# Patient Record
Sex: MALE | URBAN - METROPOLITAN AREA
[De-identification: names, ages, dates, MRNs, and addresses within clinical notes are randomized per-mention and may not be internally consistent; named-entity substitution may affect disease eponyms.]

---

## 2024-06-18 ENCOUNTER — OFFICE VISIT (OUTPATIENT)
Dept: FAMILY MEDICINE CLINIC | Facility: CLINIC | Age: 49
End: 2024-06-18

## 2024-06-18 ENCOUNTER — LAB ENCOUNTER (OUTPATIENT)
Dept: LAB | Age: 49
End: 2024-06-18
Attending: FAMILY MEDICINE

## 2024-06-18 VITALS
SYSTOLIC BLOOD PRESSURE: 128 MMHG | WEIGHT: 249.19 LBS | BODY MASS INDEX: 33.75 KG/M2 | HEIGHT: 72 IN | DIASTOLIC BLOOD PRESSURE: 86 MMHG | HEART RATE: 84 BPM

## 2024-06-18 DIAGNOSIS — R51.9 NONINTRACTABLE EPISODIC HEADACHE, UNSPECIFIED HEADACHE TYPE: ICD-10-CM

## 2024-06-18 DIAGNOSIS — H93.13 TINNITUS OF BOTH EARS: ICD-10-CM

## 2024-06-18 DIAGNOSIS — N20.0 RENAL STONE: ICD-10-CM

## 2024-06-18 DIAGNOSIS — I10 PRIMARY HYPERTENSION: ICD-10-CM

## 2024-06-18 DIAGNOSIS — I10 PRIMARY HYPERTENSION: Primary | ICD-10-CM

## 2024-06-18 DIAGNOSIS — L20.84 INTRINSIC ECZEMA: ICD-10-CM

## 2024-06-18 LAB
ALBUMIN SERPL-MCNC: 4.9 G/DL (ref 3.2–4.8)
ALBUMIN/GLOB SERPL: 1.6 {RATIO} (ref 1–2)
ALP LIVER SERPL-CCNC: 117 U/L
ALT SERPL-CCNC: 36 U/L
ANION GAP SERPL CALC-SCNC: 7 MMOL/L (ref 0–18)
AST SERPL-CCNC: 28 U/L (ref ?–34)
BASOPHILS # BLD AUTO: 0.05 X10(3) UL (ref 0–0.2)
BASOPHILS NFR BLD AUTO: 0.6 %
BILIRUB SERPL-MCNC: 0.5 MG/DL (ref 0.3–1.2)
BUN BLD-MCNC: 16 MG/DL (ref 9–23)
BUN/CREAT SERPL: 15.7 (ref 10–20)
CALCIUM BLD-MCNC: 9.9 MG/DL (ref 8.7–10.4)
CHLORIDE SERPL-SCNC: 101 MMOL/L (ref 98–112)
CO2 SERPL-SCNC: 31 MMOL/L (ref 21–32)
CREAT BLD-MCNC: 1.02 MG/DL
DEPRECATED RDW RBC AUTO: 39.5 FL (ref 35.1–46.3)
EGFRCR SERPLBLD CKD-EPI 2021: 90 ML/MIN/1.73M2 (ref 60–?)
EOSINOPHIL # BLD AUTO: 0.2 X10(3) UL (ref 0–0.7)
EOSINOPHIL NFR BLD AUTO: 2.3 %
ERYTHROCYTE [DISTWIDTH] IN BLOOD BY AUTOMATED COUNT: 13.2 % (ref 11–15)
FASTING STATUS PATIENT QL REPORTED: NO
GLOBULIN PLAS-MCNC: 3.1 G/DL (ref 2–3.5)
GLUCOSE BLD-MCNC: 110 MG/DL (ref 70–99)
HCT VFR BLD AUTO: 42.2 %
HGB BLD-MCNC: 14.6 G/DL
IMM GRANULOCYTES # BLD AUTO: 0.03 X10(3) UL (ref 0–1)
IMM GRANULOCYTES NFR BLD: 0.3 %
LYMPHOCYTES # BLD AUTO: 2.08 X10(3) UL (ref 1–4)
LYMPHOCYTES NFR BLD AUTO: 24.2 %
MCH RBC QN AUTO: 28.4 PG (ref 26–34)
MCHC RBC AUTO-ENTMCNC: 34.6 G/DL (ref 31–37)
MCV RBC AUTO: 82.1 FL
MONOCYTES # BLD AUTO: 0.39 X10(3) UL (ref 0.1–1)
MONOCYTES NFR BLD AUTO: 4.5 %
NEUTROPHILS # BLD AUTO: 5.84 X10 (3) UL (ref 1.5–7.7)
NEUTROPHILS # BLD AUTO: 5.84 X10(3) UL (ref 1.5–7.7)
NEUTROPHILS NFR BLD AUTO: 68.1 %
OSMOLALITY SERPL CALC.SUM OF ELEC: 290 MOSM/KG (ref 275–295)
PLATELET # BLD AUTO: 314 10(3)UL (ref 150–450)
POTASSIUM SERPL-SCNC: 4.2 MMOL/L (ref 3.5–5.1)
PROT SERPL-MCNC: 8 G/DL (ref 5.7–8.2)
PTH-INTACT SERPL-MCNC: 45.3 PG/ML (ref 18.5–88)
RBC # BLD AUTO: 5.14 X10(6)UL
SODIUM SERPL-SCNC: 139 MMOL/L (ref 136–145)
WBC # BLD AUTO: 8.6 X10(3) UL (ref 4–11)

## 2024-06-18 PROCEDURE — 99204 OFFICE O/P NEW MOD 45 MIN: CPT | Performed by: FAMILY MEDICINE

## 2024-06-18 PROCEDURE — 80053 COMPREHEN METABOLIC PANEL: CPT

## 2024-06-18 PROCEDURE — 83970 ASSAY OF PARATHORMONE: CPT

## 2024-06-18 PROCEDURE — 85025 COMPLETE CBC W/AUTO DIFF WBC: CPT

## 2024-06-18 PROCEDURE — 36415 COLL VENOUS BLD VENIPUNCTURE: CPT

## 2024-06-18 RX ORDER — LOSARTAN POTASSIUM AND HYDROCHLOROTHIAZIDE 12.5; 5 MG/1; MG/1
1 TABLET ORAL AS DIRECTED
COMMUNITY
Start: 2024-03-25 | End: 2024-06-18

## 2024-06-18 RX ORDER — TAMSULOSIN HYDROCHLORIDE 0.4 MG/1
0.4 CAPSULE ORAL DAILY
COMMUNITY
Start: 2024-03-18

## 2024-06-18 RX ORDER — AMLODIPINE BESYLATE 5 MG/1
5 TABLET ORAL AS DIRECTED
Qty: 90 TABLET | Refills: 2 | Status: SHIPPED | OUTPATIENT
Start: 2024-06-18

## 2024-06-18 RX ORDER — IBUPROFEN 600 MG/1
600 TABLET ORAL EVERY 6 HOURS PRN
Qty: 60 TABLET | Refills: 0 | Status: SHIPPED | OUTPATIENT
Start: 2024-06-18

## 2024-06-18 RX ORDER — AMLODIPINE BESYLATE 5 MG/1
5 TABLET ORAL AS DIRECTED
COMMUNITY
Start: 2024-04-01 | End: 2024-06-18

## 2024-06-18 RX ORDER — ERGOCALCIFEROL 1.25 MG/1
50000 CAPSULE ORAL WEEKLY
COMMUNITY
Start: 2024-04-01

## 2024-06-18 RX ORDER — MELOXICAM 15 MG/1
15 TABLET ORAL DAILY
COMMUNITY
Start: 2024-03-18

## 2024-06-18 RX ORDER — LOSARTAN POTASSIUM AND HYDROCHLOROTHIAZIDE 12.5; 5 MG/1; MG/1
1 TABLET ORAL AS DIRECTED
Qty: 90 TABLET | Refills: 2 | Status: SHIPPED | OUTPATIENT
Start: 2024-06-18

## 2024-06-18 RX ORDER — MOMETASONE FUROATE 1 MG/G
CREAM TOPICAL
Qty: 30 G | Refills: 1 | Status: SHIPPED | OUTPATIENT
Start: 2024-06-18

## 2024-06-18 NOTE — PROGRESS NOTES
6/18/2024  1:03 PM    Ilya Martinez is a 49 year old male.    Chief complaint(s):   Chief Complaint   Patient presents with    [facility] Care    Fatigue    Blood Pressure     HPI:     Ilya Martinez primary complaint is regarding multiple complaints.       Ilya He a 49 year old male presents with hypertension.  This was first diagnosed more than 03/2024 years ago.  Current nonpharmacologic treatment includes low sodium diet, exercise, and meditation.  his current cardiac medication(s) regimen includes: Amlodipine 5 mg, Losartan-hydrochlorothiazide 50-12.5 mg.  he has not kept a blood pressure diary, but states that his blood pressures have been well controll.  he is tolerating his medication(s)  well without side effects.  Compliance with treatment has been good.      In addition patient is complaining of tinnitus and headaches associated when he bends down forward.  The present headache is severe not associate with any diplopia, nausea or vomiting.  Also denies any hearing loss.  Denies any ear pain.    Furthermore he is complaining of having history of renal lithiasis for which she had past instilled.  He was given some tamsulosin which he stopped once the stone has passed through.  At present time there is no dysuria or hematuria.    Also complaining of a rash that is flaky on his forehead and around the nose area.  This started just few weeks ago.  Associated with minimal itchiness.  No history of dermal conditions or skin cancer.  Patient is a non-smoker.         HISTORY:  Past Medical History:    Essential hypertension    Renal stones      No past surgical history on file.   Family History   Problem Relation Age of Onset    Heart Disorder Father 80        AMI    Hypertension Mother     Cancer Other         prostate cancer      Social History:   Social History     Socioeconomic History    Marital status:    Tobacco Use    Smoking status: Never    Smokeless tobacco: Never        Immunizations:      There is no immunization history on file for this patient.    Medications (Active prior to today's visit):  Current Outpatient Medications   Medication Sig Dispense Refill    amLODIPine 5 MG Oral Tab Take 1 tablet (5 mg total) by mouth As Directed. 90 tablet 2    losartan-hydroCHLOROthiazide 50-12.5 MG Oral Tab Take 1 tablet by mouth As Directed. 90 tablet 2    Mometasone Furoate 0.1 % External Cream Apply to affected area(s) BID 30 g 1    ibuprofen 600 MG Oral Tab Take 1 tablet (600 mg total) by mouth every 6 (six) hours as needed for Pain. Always take it with food. 60 tablet 0    ergocalciferol 1.25 MG (45007 UT) Oral Cap Take 1 capsule (50,000 Units total) by mouth once a week. (Patient not taking: Reported on 6/18/2024)      Meloxicam 15 MG Oral Tab Take 1 tablet (15 mg total) by mouth daily. (Patient not taking: Reported on 6/18/2024)      tamsulosin 0.4 MG Oral Cap Take 1 capsule (0.4 mg total) by mouth daily. (Patient not taking: Reported on 6/18/2024)         Allergies:  Not on File      ROS:   Review of Systems   Constitutional:  Negative for appetite change, fatigue and fever.   HENT:  Positive for tinnitus. Negative for hearing loss.    Eyes:  Negative for visual disturbance.   Respiratory:  Negative for shortness of breath.    Cardiovascular:  Negative for chest pain, palpitations and leg swelling.   Gastrointestinal:  Negative for abdominal pain, nausea and vomiting.   Musculoskeletal:  Negative for myalgias.   Skin:  Positive for rash.   Neurological:  Positive for headaches. Negative for dizziness and weakness.     PHYSICAL EXAM:   VS: /86   Pulse 84   Ht 6' (1.829 m)   Wt 249 lb 3.2 oz (113 kg)   BMI 33.80 kg/m²     Physical Exam  Vitals reviewed.   Constitutional:       Appearance: Normal appearance. He is well-developed.   HENT:      Head: Normocephalic.   Eyes:      General: No scleral icterus.     Conjunctiva/sclera: Conjunctivae normal.   Cardiovascular:      Rate and Rhythm:  Normal rate and regular rhythm.      Heart sounds: Normal heart sounds.   Pulmonary:      Effort: Pulmonary effort is normal.      Breath sounds: Normal breath sounds.   Abdominal:      Tenderness: There is no right CVA tenderness or left CVA tenderness.   Musculoskeletal:      Cervical back: Neck supple.   Skin:     Findings: Rash present.      Comments: + facial eczema   Psychiatric:         Mood and Affect: Mood normal.         LABORATORY RESULTS:     EKG / Spirometry : -     Radiology: No results found.     ASSESSMENT/PLAN:   Assessment   Encounter Diagnoses   Name Primary?    Primary hypertension Yes    Renal stone     Nonintractable episodic headache, unspecified headache type     Intrinsic eczema     Tinnitus of both ears        1. Primary hypertension    MEDICATIONS:   Requested Prescriptions     Signed Prescriptions Disp Refills    amLODIPine 5 MG Oral Tab 90 tablet 2     Sig: Take 1 tablet (5 mg total) by mouth As Directed.    losartan-hydroCHLOROthiazide 50-12.5 MG Oral Tab 90 tablet 2     Sig: Take 1 tablet by mouth As Directed.     LABORATORY & ORDERS:   Orders Placed This Encounter   Procedures    Comp Metabolic Panel (14)    CBC With Differential With Platelet    PTH, Intact [E]     RECOMMENDATIONS given include: avoid pseudoephedrine or other stimulants/decongestants in common cold remedies, decrease consumption of alcohol, perform routine monitoring of blood pressure with home blood pressure cuff, exercise, reduction of dietary salt intake, take medication as prescribed, try not to miss doses, smoking cessation, weight loss, and stress reduction.    FOLLOW-UP: Schedule a follow-up visit in 6 months.     RTC for CPE soon    2. Renal stone    Lab:   - Comp Metabolic Panel (14); Future  - PTH, Intact [E]; Future  Request copy of medical records  RTC [facility]    3. Nonintractable episodic headache, unspecified headache type  Rx Ibuprofen 600 mg po Q 6 hrs prn  Follow up [facility]    4. Intrinsic  eczema    Medications:   - Mometasone Furoate 0.1 % External Cream; Apply to affected area(s) BID  Dispense: 30 g; Refill: 1    5. Tinnitus of both ears    Referrals:   - Audiology Referral - [facility])  - ENT - INTERNAL          Orders This Visit:  Orders Placed This Encounter   Procedures    Comp Metabolic Panel (14)    CBC With Differential With Platelet    PTH, Intact [E]       Meds This Visit:  Requested Prescriptions     Signed Prescriptions Disp Refills    amLODIPine 5 MG Oral Tab 90 tablet 2     Sig: Take 1 tablet (5 mg total) by mouth As Directed.    losartan-hydroCHLOROthiazide 50-12.5 MG Oral Tab 90 tablet 2     Sig: Take 1 tablet by mouth As Directed.    Mometasone Furoate 0.1 % External Cream 30 g 1     Sig: Apply to affected area(s) BID    ibuprofen 600 MG Oral Tab 60 tablet 0     Sig: Take 1 tablet (600 mg total) by mouth every 6 (six) hours as needed for Pain. Always take it with food.       Imaging & Referrals:  OP REFERRAL TO AUDIOLOGY  ENT - INTERNAL         SHERIE SAMUELS MD

## 2024-07-15 ENCOUNTER — OFFICE VISIT (OUTPATIENT)
Dept: OTOLARYNGOLOGY | Facility: CLINIC | Age: 49
End: 2024-07-15

## 2024-07-15 ENCOUNTER — OFFICE VISIT (OUTPATIENT)
Dept: AUDIOLOGY | Facility: CLINIC | Age: 49
End: 2024-07-15

## 2024-07-15 DIAGNOSIS — H93.13 TINNITUS OF BOTH EARS: Primary | ICD-10-CM

## 2024-07-15 DIAGNOSIS — J34.3 HYPERTROPHY OF BOTH INFERIOR NASAL TURBINATES: ICD-10-CM

## 2024-07-15 DIAGNOSIS — H90.3 SENSORINEURAL HEARING LOSS (SNHL) OF BOTH EARS: ICD-10-CM

## 2024-07-15 DIAGNOSIS — J34.2 NASAL SEPTAL DEVIATION: ICD-10-CM

## 2024-07-15 DIAGNOSIS — R06.83 SNORING: ICD-10-CM

## 2024-07-15 DIAGNOSIS — H93.19 TINNITUS, UNSPECIFIED LATERALITY: Primary | ICD-10-CM

## 2024-07-15 DIAGNOSIS — J34.89 NASAL OBSTRUCTION: ICD-10-CM

## 2024-07-15 PROCEDURE — 92567 TYMPANOMETRY: CPT | Performed by: AUDIOLOGIST

## 2024-07-15 PROCEDURE — 92557 COMPREHENSIVE HEARING TEST: CPT | Performed by: AUDIOLOGIST

## 2024-07-15 RX ORDER — MONTELUKAST SODIUM 10 MG/1
10 TABLET ORAL NIGHTLY
Qty: 30 TABLET | Refills: 3 | Status: SHIPPED | OUTPATIENT
Start: 2024-07-15

## 2024-07-15 RX ORDER — LEVOCETIRIZINE DIHYDROCHLORIDE 5 MG/1
5 TABLET, FILM COATED ORAL EVERY EVENING
Qty: 30 TABLET | Refills: 0 | Status: SHIPPED | OUTPATIENT
Start: 2024-07-15

## 2024-07-15 RX ORDER — AZELASTINE 1 MG/ML
2 SPRAY, METERED NASAL 2 TIMES DAILY
Qty: 30 ML | Refills: 0 | Status: SHIPPED | OUTPATIENT
Start: 2024-07-15

## 2024-07-15 NOTE — PROGRESS NOTES
Ilya Martinez is a 49 year old male.   Chief Complaint   Patient presents with    Ear Problem     Patient is here for ringing of the ear x 8 months patient reports dizziness sometimes difficult to sleep at night and snores sometimes. Patient reports breathing only from one nostril. Sometimes left sometimes right.     HPI:   49-year-old presents with multiple complaints including tinnitus for several months to years, snoring and nasal obstruction during sleep.  Reports that he is only able to breathe through 1 nostril.  He feels more congested on the left side.  Denies any significant noise exposure.  Works as an .    Current Outpatient Medications   Medication Sig Dispense Refill    azelastine 0.1 % Nasal Solution 2 sprays by Nasal route 2 (two) times daily. 30 mL 0    montelukast 10 MG Oral Tab Take 1 tablet (10 mg total) by mouth nightly. 30 tablet 3    levocetirizine 5 MG Oral Tab Take 1 tablet (5 mg total) by mouth every evening. 30 tablet 0    amLODIPine 5 MG Oral Tab Take 1 tablet (5 mg total) by mouth As Directed. 90 tablet 2    losartan-hydroCHLOROthiazide 50-12.5 MG Oral Tab Take 1 tablet by mouth As Directed. 90 tablet 2    Mometasone Furoate 0.1 % External Cream Apply to affected area(s) BID 30 g 1    ibuprofen 600 MG Oral Tab Take 1 tablet (600 mg total) by mouth every 6 (six) hours as needed for Pain. Always take it with food. 60 tablet 0    ergocalciferol 1.25 MG (73968 UT) Oral Cap Take 1 capsule (50,000 Units total) by mouth once a week. (Patient not taking: Reported on 6/18/2024)      Meloxicam 15 MG Oral Tab Take 1 tablet (15 mg total) by mouth daily. (Patient not taking: Reported on 6/18/2024)      tamsulosin 0.4 MG Oral Cap Take 1 capsule (0.4 mg total) by mouth daily. (Patient not taking: Reported on 6/18/2024)        Past Medical History:    Essential hypertension    Renal stones      Social History:  Social History     Socioeconomic History    Marital status:    Tobacco Use     Smoking status: Never    Smokeless tobacco: Never      History reviewed. No pertinent surgical history.      EXAM:   There were no vitals taken for this visit.    System Details   Skin Inspection - Normal.   Constitutional Overall appearance - Normal.   Head/Face Symmetric, TMJ tenderness not present    Eyes EOMI, PERRL   Right ear:  Canal clear, TM intact, no DC   Left ear:  Canal clear, TM intact, no DC   Nose: Septum midline, inferior turbinates not enlarged, nasal valves without collapse    Oral cavity/Oropharynx: No lesions or masses on inspection or palpation, tonsils symmetric    Neck: Soft without LAD, thyroid not enlarged  Voice clear/ no stridor   Other:      SCOPES AND PROCEDURES:     Nasal Endoscopy Procedure Note     Due to inability for adequate examination of the nose and nasopharynx and need for magnification to perform the examination, endoscopy was performed.  Risks and benefits were discussed with patient/family and they have given verbal consent to proceed.    Pre-operative Diagnosis:   1. Tinnitus, unspecified laterality    2. Sensorineural hearing loss (SNHL) of both ears    3. Nasal septal deviation    4. Snoring    5. Nasal obstruction    6. Hypertrophy of both inferior nasal turbinates        Post-operative Diagnosis: Same    Procedure: Diagnostic nasal endoscopy    Anesthesia: Topical anesthetic [place]     Surgeon Jd Frankel MD    EBL: 0cc    Procedure Detail & Findings:     After placement of topical anesthetic intranasally the endoscope was inserted into each nares and driven through the nasal cavity into the nasopharynx. The following findings were noted:    Septum: Deviated to the left  Inferior turbinates: Hypertrophy on both sides  Middle meatus: Patent  Middle turbinates: Normal  Purulence: None noted  Polyps: None noted  Nasopharynx and eustachian tube: No masses  Other: The middle and superior meatus, the turbinates, and the spheno-ethmoid recess were inspected and seen to be  without significant abnormal findings.     Condition: Stable    Complications: Patient tolerated the procedure well with no immediate complication.    Jd Frankel MD    AUDIOGRAM AND IMAGING:     Audiogram with mild sensorineural hearing loss at 3000 Hz and 8000 Hz as well in the right ear as a mild mixed hearing loss at 3000 Hz in the left ear    IMPRESSION:   1. Tinnitus, unspecified laterality    2. Sensorineural hearing loss (SNHL) of both ears    3. Nasal septal deviation    4. Snoring    5. Nasal obstruction    6. Hypertrophy of both inferior nasal turbinates       Recommendations:  -Will trial Astelin, Singulair and Xyzal for his nasal obstruction and snoring.  Likely his septal deviation and turbinate hypertrophy are contributing to his nasal obstruction and snoring.  -Will see him back in 3 weeks if still not improving may consider septoplasty and turbinate reduction for his nasal obstruction and snoring  -Discussed the utility of a sleep study and possibly CPAP as well although he deferred this today  -Has a mild bilateral hearing loss mixed in the left ear and sensorineural on the right at 3000 Hz.  This could be an underlying cause of his tinnitus and discussed imaging to possibly investigate this further although it has been longstanding and stable and we discussed tinnitus coping methods    The patient indicates understanding of these issues and agrees to the plan.  Consult from Dr Savage regarding tinnitus    Jd Frankel MD  7/15/2024  5:48 PM